# Patient Record
Sex: FEMALE | Race: WHITE | Employment: FULL TIME | URBAN - METROPOLITAN AREA
[De-identification: names, ages, dates, MRNs, and addresses within clinical notes are randomized per-mention and may not be internally consistent; named-entity substitution may affect disease eponyms.]

---

## 2021-12-05 ENCOUNTER — HOSPITAL ENCOUNTER (OUTPATIENT)
Age: 31
Discharge: HOME | End: 2021-12-05
Payer: COMMERCIAL

## 2021-12-05 VITALS
BODY MASS INDEX: 25.71 KG/M2 | SYSTOLIC BLOOD PRESSURE: 120 MMHG | RESPIRATION RATE: 20 BRPM | WEIGHT: 160 LBS | OXYGEN SATURATION: 100 % | HEIGHT: 66 IN | HEART RATE: 74 BPM | TEMPERATURE: 98 F | DIASTOLIC BLOOD PRESSURE: 61 MMHG

## 2021-12-05 DIAGNOSIS — U07.1 COVID-19 VIRUS INFECTION: Primary | ICD-10-CM

## 2021-12-05 DIAGNOSIS — R03.0 ELEVATED BLOOD PRESSURE READING: ICD-10-CM

## 2021-12-05 PROCEDURE — 99204 OFFICE O/P NEW MOD 45 MIN: CPT

## 2021-12-05 RX ORDER — ACETAMINOPHEN 325 MG/1
650 TABLET ORAL
Qty: 40 TABLET | Refills: 0 | Status: SHIPPED | OUTPATIENT
Start: 2021-12-05

## 2021-12-05 NOTE — ED QUICK NOTES
Patient with brief episode of loc after iv placement. Patient arousable to sternal rub alert and orientated. bp 84/41 hr 40, repeat bp 104/73 hr 83. Provider notified.

## 2021-12-05 NOTE — ED INITIAL ASSESSMENT (HPI)
Patient states her significant other began to develop flu like symptoms last Sunday. On Wednesday patient developed a dry cough, body aches and chills. She does note improvement in symptoms but she is still with congestion and raspy cough.   Also notes cr

## 2021-12-05 NOTE — ED PROVIDER NOTES
Patient Seen in: [facility]    History   Patient presents with:  Covid-19 Test    Stated Complaint: Flu-Covid like symptoms    HPI    78-year-old female presents with chief complaint of cough. Onset 4 days ago.  Patient reports associated nasa provider. Constitutional: The patient is cooperative. Appears well-developed and well-nourished. No acute distress. Psychological: Alert, No abnormalities of mood, affect. Head: Normocephalic/atraumatic. Nontender.   Eyes: Pupils are equal round reac or hospitalization and have the following risks factors–BMI greater than 25.       The patient/caregiver has been given the “Fact Sheet for Patients, Parents and Caregivers”, informed of alternatives to receiving authorized Casirivimab and Adelene Berry, and in reading    Disposition:  Discharge    Follow-up:  Corrine Segura MD  [address] [facility] [id number]    Call in 2 days  For follow-up      Medications Prescribed:  Current Discharge Medication List    START taking these me

## 2021-12-08 ENCOUNTER — HOSPITAL ENCOUNTER (OUTPATIENT)
Dept: ULTRASOUND IMAGING | Facility: HOSPITAL | Age: 31
Discharge: HOME | End: 2021-12-08
Attending: PHYSICIAN ASSISTANT
Payer: COMMERCIAL

## 2021-12-08 ENCOUNTER — TELEMEDICINE (OUTPATIENT)
Dept: FAMILY MEDICINE CLINIC | Facility: CLINIC | Age: 31
End: 2021-12-08

## 2021-12-08 DIAGNOSIS — M79.605 LEG PAIN, BILATERAL: Primary | ICD-10-CM

## 2021-12-08 DIAGNOSIS — M79.604 LEG PAIN, BILATERAL: Primary | ICD-10-CM

## 2021-12-08 DIAGNOSIS — M79.604 LEG PAIN, BILATERAL: ICD-10-CM

## 2021-12-08 DIAGNOSIS — U07.1 COVID-19 VIRUS INFECTION: ICD-10-CM

## 2021-12-08 DIAGNOSIS — M79.605 LEG PAIN, BILATERAL: ICD-10-CM

## 2021-12-08 PROCEDURE — 99202 OFFICE O/P NEW SF 15 MIN: CPT | Performed by: PHYSICIAN ASSISTANT

## 2021-12-08 PROCEDURE — 93970 EXTREMITY STUDY: CPT | Performed by: PHYSICIAN ASSISTANT

## 2021-12-09 NOTE — PROGRESS NOTES
HPI: HPI  Patient has tested positive for Covid on 12/05/21. Patient complains of loss of taste and smell today, mild cough, headache, sinus pressure and both legs pain when walking today.   Patient denies of chest pain, SOB, N/V/C/D, fever, dizziness, change, chills, fatigue and fever. HENT: Positive for sinus pressure and sinus pain. Negative for congestion, ear discharge, ear pain, postnasal drip, rhinorrhea and sore throat. Respiratory: Positive for cough.  Negative for chest tightness, shortness

## 2021-12-17 ENCOUNTER — TELEPHONE (OUTPATIENT)
Dept: CASE MANAGEMENT | Age: 31
End: 2021-12-17

## 2021-12-17 NOTE — TELEPHONE ENCOUNTER
Called patient to follow up for home monitoring  Left message to call back.    COVID 19 positive: 12/5

## 2022-11-21 ENCOUNTER — OFFICE VISIT (OUTPATIENT)
Dept: FAMILY MEDICINE CLINIC | Facility: CLINIC | Age: 32
End: 2022-11-21
Payer: COMMERCIAL

## 2022-11-21 ENCOUNTER — LAB ENCOUNTER (OUTPATIENT)
Dept: LAB | Age: 32
End: 2022-11-21
Attending: STUDENT IN AN ORGANIZED HEALTH CARE EDUCATION/TRAINING PROGRAM
Payer: COMMERCIAL

## 2022-11-21 VITALS
BODY MASS INDEX: 26.36 KG/M2 | DIASTOLIC BLOOD PRESSURE: 79 MMHG | HEART RATE: 71 BPM | HEIGHT: 66 IN | RESPIRATION RATE: 16 BRPM | SYSTOLIC BLOOD PRESSURE: 131 MMHG | WEIGHT: 164 LBS

## 2022-11-21 DIAGNOSIS — J02.9 SORE THROAT: ICD-10-CM

## 2022-11-21 DIAGNOSIS — Z00.00 WELL ADULT EXAM: ICD-10-CM

## 2022-11-21 DIAGNOSIS — Z00.00 WELL ADULT EXAM: Primary | ICD-10-CM

## 2022-11-21 LAB
ALBUMIN SERPL-MCNC: 4.1 G/DL (ref 3.4–5)
ALBUMIN/GLOB SERPL: 1.2 {RATIO} (ref 1–2)
ALP LIVER SERPL-CCNC: 66 U/L
ALT SERPL-CCNC: 22 U/L
ANION GAP SERPL CALC-SCNC: 8 MMOL/L (ref 0–18)
AST SERPL-CCNC: 16 U/L (ref 15–37)
BILIRUB SERPL-MCNC: 0.4 MG/DL (ref 0.1–2)
BILIRUB UR QL: NEGATIVE
BUN BLD-MCNC: 8 MG/DL (ref 7–18)
BUN/CREAT SERPL: 14.3 (ref 10–20)
CALCIUM BLD-MCNC: 8.9 MG/DL (ref 8.5–10.1)
CHLORIDE SERPL-SCNC: 107 MMOL/L (ref 98–112)
CHOLEST SERPL-MCNC: 185 MG/DL (ref ?–200)
CLARITY UR: CLEAR
CO2 SERPL-SCNC: 26 MMOL/L (ref 21–32)
COLOR UR: YELLOW
CONTROL LINE PRESENT WITH A CLEAR BACKGROUND (YES/NO): YES YES/NO
CREAT BLD-MCNC: 0.56 MG/DL
DEPRECATED RDW RBC AUTO: 36 FL (ref 35.1–46.3)
ERYTHROCYTE [DISTWIDTH] IN BLOOD BY AUTOMATED COUNT: 11.8 % (ref 11–15)
EST. AVERAGE GLUCOSE BLD GHB EST-MCNC: 97 MG/DL (ref 68–126)
FASTING PATIENT LIPID ANSWER: NO
FASTING STATUS PATIENT QL REPORTED: NO
GFR SERPLBLD BASED ON 1.73 SQ M-ARVRAT: 123 ML/MIN/1.73M2 (ref 60–?)
GLOBULIN PLAS-MCNC: 3.3 G/DL (ref 2.8–4.4)
GLUCOSE BLD-MCNC: 84 MG/DL (ref 70–99)
GLUCOSE UR-MCNC: NEGATIVE MG/DL
HBA1C MFR BLD: 5 % (ref ?–5.7)
HCT VFR BLD AUTO: 37.5 %
HDLC SERPL-MCNC: 49 MG/DL (ref 40–59)
HGB BLD-MCNC: 12.3 G/DL
HGB UR QL STRIP.AUTO: NEGATIVE
KETONES UR-MCNC: NEGATIVE MG/DL
KIT EXPIRATION DATE: NORMAL DATE
KIT LOT #: NORMAL NUMERIC
LDLC SERPL CALC-MCNC: 117 MG/DL (ref ?–100)
MCH RBC QN AUTO: 27.9 PG (ref 26–34)
MCHC RBC AUTO-ENTMCNC: 32.8 G/DL (ref 31–37)
MCV RBC AUTO: 85 FL
NITRITE UR QL STRIP.AUTO: NEGATIVE
NONHDLC SERPL-MCNC: 136 MG/DL (ref ?–130)
OSMOLALITY SERPL CALC.SUM OF ELEC: 290 MOSM/KG (ref 275–295)
PH UR: 7 [PH] (ref 5–8)
PLATELET # BLD AUTO: 242 10(3)UL (ref 150–450)
POTASSIUM SERPL-SCNC: 4.2 MMOL/L (ref 3.5–5.1)
PROT SERPL-MCNC: 7.4 G/DL (ref 6.4–8.2)
PROT UR-MCNC: NEGATIVE MG/DL
RBC # BLD AUTO: 4.41 X10(6)UL
SODIUM SERPL-SCNC: 141 MMOL/L (ref 136–145)
SP GR UR STRIP: 1.01 (ref 1–1.03)
STREP GRP A CUL-SCR: NEGATIVE
TRIGL SERPL-MCNC: 102 MG/DL (ref 30–149)
TSI SER-ACNC: 0.89 MIU/ML (ref 0.36–3.74)
UROBILINOGEN UR STRIP-ACNC: 0.2
VIT D+METAB SERPL-MCNC: 9.9 NG/ML (ref 30–100)
VLDLC SERPL CALC-MCNC: 18 MG/DL (ref 0–30)
WBC # BLD AUTO: 7 X10(3) UL (ref 4–11)

## 2022-11-21 PROCEDURE — 83036 HEMOGLOBIN GLYCOSYLATED A1C: CPT

## 2022-11-21 PROCEDURE — 3078F DIAST BP <80 MM HG: CPT | Performed by: STUDENT IN AN ORGANIZED HEALTH CARE EDUCATION/TRAINING PROGRAM

## 2022-11-21 PROCEDURE — 82306 VITAMIN D 25 HYDROXY: CPT

## 2022-11-21 PROCEDURE — 99385 PREV VISIT NEW AGE 18-39: CPT | Performed by: STUDENT IN AN ORGANIZED HEALTH CARE EDUCATION/TRAINING PROGRAM

## 2022-11-21 PROCEDURE — 3075F SYST BP GE 130 - 139MM HG: CPT | Performed by: STUDENT IN AN ORGANIZED HEALTH CARE EDUCATION/TRAINING PROGRAM

## 2022-11-21 PROCEDURE — 80053 COMPREHEN METABOLIC PANEL: CPT

## 2022-11-21 PROCEDURE — 36415 COLL VENOUS BLD VENIPUNCTURE: CPT

## 2022-11-21 PROCEDURE — 84443 ASSAY THYROID STIM HORMONE: CPT

## 2022-11-21 PROCEDURE — 81001 URINALYSIS AUTO W/SCOPE: CPT

## 2022-11-21 PROCEDURE — 3008F BODY MASS INDEX DOCD: CPT | Performed by: STUDENT IN AN ORGANIZED HEALTH CARE EDUCATION/TRAINING PROGRAM

## 2022-11-21 PROCEDURE — 81015 MICROSCOPIC EXAM OF URINE: CPT

## 2022-11-21 PROCEDURE — 87880 STREP A ASSAY W/OPTIC: CPT | Performed by: STUDENT IN AN ORGANIZED HEALTH CARE EDUCATION/TRAINING PROGRAM

## 2022-11-21 PROCEDURE — 85027 COMPLETE CBC AUTOMATED: CPT

## 2022-11-21 PROCEDURE — 80061 LIPID PANEL: CPT

## 2022-11-21 RX ORDER — CLOBETASOL PROPIONATE 0.5 MG/G
CREAM TOPICAL
COMMUNITY
Start: 2022-11-10 | End: 2022-11-21

## 2022-11-22 LAB
FLUAV + FLUBV RNA SPEC NAA+PROBE: NOT DETECTED
FLUAV + FLUBV RNA SPEC NAA+PROBE: NOT DETECTED
RSV RNA SPEC NAA+PROBE: NOT DETECTED
SARS-COV-2 RNA RESP QL NAA+PROBE: NOT DETECTED

## 2023-06-19 ENCOUNTER — HOSPITAL ENCOUNTER (OUTPATIENT)
Age: 33
Discharge: HOME | End: 2023-06-19
Payer: COMMERCIAL

## 2023-06-19 VITALS
OXYGEN SATURATION: 100 % | SYSTOLIC BLOOD PRESSURE: 125 MMHG | DIASTOLIC BLOOD PRESSURE: 70 MMHG | HEART RATE: 82 BPM | RESPIRATION RATE: 18 BRPM | TEMPERATURE: 98 F

## 2023-06-19 DIAGNOSIS — J01.90 ACUTE NON-RECURRENT SINUSITIS, UNSPECIFIED LOCATION: Primary | ICD-10-CM

## 2023-06-19 DIAGNOSIS — J02.9 ACUTE PHARYNGITIS, UNSPECIFIED ETIOLOGY: ICD-10-CM

## 2023-06-19 LAB — S PYO AG THROAT QL IA.RAPID: NEGATIVE

## 2023-06-19 PROCEDURE — 99213 OFFICE O/P EST LOW 20 MIN: CPT

## 2023-06-19 PROCEDURE — 87651 STREP A DNA AMP PROBE: CPT | Performed by: PHYSICIAN ASSISTANT

## 2023-06-19 RX ORDER — IBUPROFEN 600 MG/1
TABLET ORAL
Qty: 20 TABLET | Refills: 0 | Status: SHIPPED | OUTPATIENT
Start: 2023-06-19

## 2023-06-19 RX ORDER — AMOXICILLIN AND CLAVULANATE POTASSIUM 875; 125 MG/1; MG/1
1 TABLET, FILM COATED ORAL 2 TIMES DAILY
Qty: 14 TABLET | Refills: 0 | Status: SHIPPED | OUTPATIENT
Start: 2023-06-19 | End: 2023-06-26

## 2023-11-27 ENCOUNTER — APPOINTMENT (OUTPATIENT)
Dept: CT IMAGING | Age: 33
End: 2023-11-27
Attending: EMERGENCY MEDICINE

## 2023-11-27 ENCOUNTER — HOSPITAL ENCOUNTER (EMERGENCY)
Age: 33
Discharge: HOME | End: 2023-11-27
Attending: EMERGENCY MEDICINE

## 2023-11-27 VITALS
DIASTOLIC BLOOD PRESSURE: 82 MMHG | OXYGEN SATURATION: 100 % | SYSTOLIC BLOOD PRESSURE: 127 MMHG | HEIGHT: 66 IN | RESPIRATION RATE: 16 BRPM | BODY MASS INDEX: 27.53 KG/M2 | WEIGHT: 171.3 LBS | HEART RATE: 78 BPM | TEMPERATURE: 97.8 F

## 2023-11-27 DIAGNOSIS — J00 NASOPHARYNGITIS ACUTE: Primary | ICD-10-CM

## 2023-11-27 LAB
ALBUMIN SERPL-MCNC: 4 G/DL (ref 3.6–5.1)
ALBUMIN/GLOB SERPL: 1.1 {RATIO} (ref 1–2.4)
ALP SERPL-CCNC: 70 UNITS/L (ref 45–117)
ALT SERPL-CCNC: 23 UNITS/L
ANION GAP SERPL CALC-SCNC: 9 MMOL/L (ref 7–19)
AST SERPL-CCNC: 23 UNITS/L
BASOPHILS # BLD: 0.1 K/MCL (ref 0–0.3)
BASOPHILS NFR BLD: 1 %
BILIRUB SERPL-MCNC: 0.4 MG/DL (ref 0.2–1)
BUN SERPL-MCNC: 12 MG/DL (ref 6–20)
BUN/CREAT SERPL: 19 (ref 7–25)
CALCIUM SERPL-MCNC: 9 MG/DL (ref 8.4–10.2)
CHLORIDE SERPL-SCNC: 108 MMOL/L (ref 97–110)
CO2 SERPL-SCNC: 28 MMOL/L (ref 21–32)
CREAT SERPL-MCNC: 0.64 MG/DL (ref 0.51–0.95)
DEPRECATED RDW RBC: 36.4 FL (ref 39–50)
EGFRCR SERPLBLD CKD-EPI 2021: >90 ML/MIN/{1.73_M2}
EOSINOPHIL # BLD: 0.5 K/MCL (ref 0–0.5)
EOSINOPHIL NFR BLD: 5 %
ERYTHROCYTE [DISTWIDTH] IN BLOOD: 12 % (ref 11–15)
FASTING DURATION TIME PATIENT: ABNORMAL H
FLUAV RNA RESP QL NAA+PROBE: NOT DETECTED
FLUBV RNA RESP QL NAA+PROBE: NOT DETECTED
GLOBULIN SER-MCNC: 3.6 G/DL (ref 2–4)
GLUCOSE SERPL-MCNC: 100 MG/DL (ref 70–99)
HCG SERPL QL: NEGATIVE
HCT VFR BLD CALC: 39.1 % (ref 36–46.5)
HGB BLD-MCNC: 12.9 G/DL (ref 12–15.5)
IMM GRANULOCYTES # BLD AUTO: 0 K/MCL (ref 0–0.2)
IMM GRANULOCYTES # BLD: 0 %
LYMPHOCYTES # BLD: 2.8 K/MCL (ref 1–4.8)
LYMPHOCYTES NFR BLD: 26 %
MCH RBC QN AUTO: 27.7 PG (ref 26–34)
MCHC RBC AUTO-ENTMCNC: 33 G/DL (ref 32–36.5)
MCV RBC AUTO: 84.1 FL (ref 78–100)
MONOCYTES # BLD: 0.6 K/MCL (ref 0.3–0.9)
MONOCYTES NFR BLD: 6 %
NEUTROPHILS # BLD: 6.7 K/MCL (ref 1.8–7.7)
NEUTROPHILS NFR BLD: 62 %
NRBC BLD MANUAL-RTO: 0 /100 WBC
PLATELET # BLD AUTO: 265 K/MCL (ref 140–450)
POTASSIUM SERPL-SCNC: 3.7 MMOL/L (ref 3.4–5.1)
PROT SERPL-MCNC: 7.6 G/DL (ref 6.4–8.2)
RAINBOW EXTRA TUBES HOLD SPECIMEN: NORMAL
RAINBOW EXTRA TUBES HOLD SPECIMEN: NORMAL
RBC # BLD: 4.65 MIL/MCL (ref 4–5.2)
RSV AG NPH QL IA.RAPID: NOT DETECTED
S PYO DNA THROAT QL NAA+PROBE: NOT DETECTED
SARS-COV-2 RNA RESP QL NAA+PROBE: NOT DETECTED
SERVICE CMNT-IMP: NORMAL
SERVICE CMNT-IMP: NORMAL
SODIUM SERPL-SCNC: 141 MMOL/L (ref 135–145)
WBC # BLD: 10.6 K/MCL (ref 4.2–11)

## 2023-11-27 PROCEDURE — 70491 CT SOFT TISSUE NECK W/DYE: CPT

## 2023-11-27 PROCEDURE — 10002805 HB CONTRAST AGENT: Performed by: EMERGENCY MEDICINE

## 2023-11-27 PROCEDURE — 10002800 HB RX 250 W HCPCS: Performed by: EMERGENCY MEDICINE

## 2023-11-27 PROCEDURE — 87651 STREP A DNA AMP PROBE: CPT | Performed by: EMERGENCY MEDICINE

## 2023-11-27 PROCEDURE — 96365 THER/PROPH/DIAG IV INF INIT: CPT

## 2023-11-27 PROCEDURE — 0241U COVID/FLU/RSV PANEL: CPT | Performed by: EMERGENCY MEDICINE

## 2023-11-27 PROCEDURE — 99283 EMERGENCY DEPT VISIT LOW MDM: CPT

## 2023-11-27 PROCEDURE — 96375 TX/PRO/DX INJ NEW DRUG ADDON: CPT

## 2023-11-27 PROCEDURE — 85025 COMPLETE CBC W/AUTO DIFF WBC: CPT | Performed by: EMERGENCY MEDICINE

## 2023-11-27 PROCEDURE — 10002807 HB RX 258: Performed by: EMERGENCY MEDICINE

## 2023-11-27 PROCEDURE — 80053 COMPREHEN METABOLIC PANEL: CPT | Performed by: EMERGENCY MEDICINE

## 2023-11-27 PROCEDURE — C9803 HOPD COVID-19 SPEC COLLECT: HCPCS

## 2023-11-27 PROCEDURE — 84703 CHORIONIC GONADOTROPIN ASSAY: CPT | Performed by: EMERGENCY MEDICINE

## 2023-11-27 RX ORDER — AMOXICILLIN AND CLAVULANATE POTASSIUM 875; 125 MG/1; MG/1
1 TABLET, FILM COATED ORAL 2 TIMES DAILY
Qty: 20 TABLET | Refills: 0 | Status: SHIPPED | OUTPATIENT
Start: 2023-11-27 | End: 2023-12-07

## 2023-11-27 RX ORDER — METHYLPREDNISOLONE 4 MG/1
TABLET ORAL
Qty: 21 TABLET | Refills: 0 | Status: SHIPPED | OUTPATIENT
Start: 2023-11-27

## 2023-11-27 RX ORDER — KETOROLAC TROMETHAMINE 15 MG/ML
15 INJECTION, SOLUTION INTRAMUSCULAR; INTRAVENOUS ONCE
Status: COMPLETED | OUTPATIENT
Start: 2023-11-27 | End: 2023-11-27

## 2023-11-27 RX ORDER — DEXAMETHASONE SODIUM PHOSPHATE 10 MG/ML
10 INJECTION, SOLUTION INTRAMUSCULAR; INTRAVENOUS ONCE
Status: COMPLETED | OUTPATIENT
Start: 2023-11-27 | End: 2023-11-27

## 2023-11-27 RX ADMIN — AMPICILLIN AND SULBACTAM 3 G: 2; 1 INJECTION, POWDER, FOR SOLUTION INTRAVENOUS at 04:36

## 2023-11-27 RX ADMIN — IOHEXOL 85 ML: 350 INJECTION, SOLUTION INTRAVENOUS at 03:07

## 2023-11-27 RX ADMIN — KETOROLAC TROMETHAMINE 15 MG: 15 INJECTION, SOLUTION INTRAMUSCULAR; INTRAVENOUS at 02:49

## 2023-11-27 RX ADMIN — DEXAMETHASONE SODIUM PHOSPHATE 10 MG: 10 INJECTION INTRAMUSCULAR; INTRAVENOUS at 02:48

## 2023-11-27 ASSESSMENT — PAIN SCALES - GENERAL
PAINLEVEL_OUTOF10: 8
PAINLEVEL_OUTOF10: 8

## 2023-11-27 ASSESSMENT — PAIN DESCRIPTION - PAIN TYPE: TYPE: ACUTE PAIN

## 2023-11-30 ENCOUNTER — OFFICE VISIT (OUTPATIENT)
Dept: FAMILY MEDICINE CLINIC | Facility: CLINIC | Age: 33
End: 2023-11-30
Payer: COMMERCIAL

## 2023-11-30 VITALS
HEART RATE: 70 BPM | DIASTOLIC BLOOD PRESSURE: 78 MMHG | RESPIRATION RATE: 18 BRPM | BODY MASS INDEX: 27 KG/M2 | WEIGHT: 164 LBS | SYSTOLIC BLOOD PRESSURE: 115 MMHG | HEIGHT: 65.5 IN | TEMPERATURE: 97 F

## 2023-11-30 DIAGNOSIS — Z00.00 ROUTINE PHYSICAL EXAMINATION: Primary | ICD-10-CM

## 2023-11-30 PROCEDURE — 3078F DIAST BP <80 MM HG: CPT | Performed by: FAMILY MEDICINE

## 2023-11-30 PROCEDURE — 3008F BODY MASS INDEX DOCD: CPT | Performed by: FAMILY MEDICINE

## 2023-11-30 PROCEDURE — 3074F SYST BP LT 130 MM HG: CPT | Performed by: FAMILY MEDICINE

## 2023-11-30 PROCEDURE — 99395 PREV VISIT EST AGE 18-39: CPT | Performed by: FAMILY MEDICINE

## 2023-11-30 RX ORDER — METHYLPREDNISOLONE 4 MG/1
1 TABLET ORAL AS DIRECTED
COMMUNITY
Start: 2023-11-27

## 2023-11-30 RX ORDER — AMOXICILLIN AND CLAVULANATE POTASSIUM 875; 125 MG/1; MG/1
1 TABLET, FILM COATED ORAL 2 TIMES DAILY
COMMUNITY
Start: 2023-11-27 | End: 2023-12-07

## 2023-11-30 NOTE — PROGRESS NOTES
Subjective:   Patient ID: Matt Lee is a 26year old female. Patient is here for routine physical exam. No significant chronic medical problems. Diet and exercise have been fair. Pt requesting blood work. Past medical history, family history, and social history were reviewed. Family hx of hypertension and diabetes. Has appointment with gyne for well woman tala.  Pt is on abx /steroid pack for a recent throat infection. Feeling much better now. History/Other:   Review of Systems   Constitutional: Negative. Negative for fever. HENT: Negative. Negative for congestion, ear pain and sore throat. Eyes: Negative. Respiratory: Negative. Negative for cough and shortness of breath. Cardiovascular: Negative. Negative for chest pain. Gastrointestinal: Negative. Negative for abdominal pain. Endocrine: Negative. Genitourinary: Negative. Negative for difficulty urinating. Musculoskeletal: Negative. Skin: Negative. Allergic/Immunologic: Negative. Neurological: Negative. Negative for dizziness and headaches. Hematological: Negative. Psychiatric/Behavioral: Negative. Negative for dysphoric mood. The patient is not nervous/anxious. Current Outpatient Medications   Medication Sig Dispense Refill    IUD'S IU       methylPREDNISolone 4 MG Oral Tablet Therapy Pack Take 1 tablet (4 mg total) by mouth As Directed. amoxicillin clavulanate 875-125 MG Oral Tab Take 1 tablet by mouth 2 (two) times daily. Allergies: Allergies   Allergen Reactions    Seasonal UNKNOWN       Objective:   Physical Exam  Constitutional:       Appearance: Normal appearance. She is well-developed. HENT:      Head: Normocephalic and atraumatic.       Right Ear: Tympanic membrane and external ear normal.      Left Ear: Tympanic membrane and external ear normal.      Nose: Nose normal.      Mouth/Throat:      Mouth: Mucous membranes are moist.      Pharynx: No posterior oropharyngeal erythema. Eyes:      Conjunctiva/sclera: Conjunctivae normal.   Neck:      Thyroid: No thyroid mass, thyromegaly or thyroid tenderness. Cardiovascular:      Rate and Rhythm: Normal rate and regular rhythm. Pulses: Normal pulses. Heart sounds: Normal heart sounds. Pulmonary:      Effort: Pulmonary effort is normal.      Breath sounds: Normal breath sounds. Abdominal:      General: Abdomen is flat. There is no distension. Palpations: Abdomen is soft. Tenderness: There is no abdominal tenderness. There is no guarding or rebound. Genitourinary:     Vagina: Normal.   Musculoskeletal:         General: Normal range of motion. Cervical back: Normal range of motion and neck supple. Lymphadenopathy:      Cervical: No cervical adenopathy. Skin:     General: Skin is warm. Neurological:      General: No focal deficit present. Mental Status: She is alert and oriented to person, place, and time. Deep Tendon Reflexes: Reflexes are normal and symmetric. Reflexes normal.   Psychiatric:         Mood and Affect: Mood normal.         Behavior: Behavior normal.         Thought Content: Thought content normal.         Judgment: Judgment normal.         Assessment & Plan:   1. Routine physical examination:  - Exam is unremarkable. Screening tests were discussed, and after discussion, will check lab work as below. Healthy diet, exercise, and weight were discussed. To call if problems and follow up and further management after testing. Routine follow up with gyne as scheduled. No orders of the defined types were placed in this encounter.       Meds This Visit:  Requested Prescriptions      No prescriptions requested or ordered in this encounter       Imaging & Referrals:  None

## 2023-12-16 ENCOUNTER — LAB ENCOUNTER (OUTPATIENT)
Dept: LAB | Age: 33
End: 2023-12-16
Payer: COMMERCIAL

## 2023-12-16 DIAGNOSIS — Z00.00 ROUTINE PHYSICAL EXAMINATION: ICD-10-CM

## 2023-12-16 LAB
ALBUMIN SERPL-MCNC: 4.4 G/DL (ref 3.2–4.8)
ALBUMIN/GLOB SERPL: 1.6 {RATIO} (ref 1–2)
ALP LIVER SERPL-CCNC: 63 U/L
ALT SERPL-CCNC: 12 U/L
ANION GAP SERPL CALC-SCNC: 3 MMOL/L (ref 0–18)
AST SERPL-CCNC: 17 U/L (ref ?–34)
BASOPHILS # BLD AUTO: 0.04 X10(3) UL (ref 0–0.2)
BASOPHILS NFR BLD AUTO: 0.8 %
BILIRUB SERPL-MCNC: 0.8 MG/DL (ref 0.3–1.2)
BUN BLD-MCNC: 10 MG/DL (ref 9–23)
BUN/CREAT SERPL: 14.7 (ref 10–20)
CALCIUM BLD-MCNC: 9.4 MG/DL (ref 8.7–10.4)
CHLORIDE SERPL-SCNC: 109 MMOL/L (ref 98–112)
CHOLEST SERPL-MCNC: 229 MG/DL (ref ?–200)
CO2 SERPL-SCNC: 27 MMOL/L (ref 21–32)
CREAT BLD-MCNC: 0.68 MG/DL
DEPRECATED RDW RBC AUTO: 36.9 FL (ref 35.1–46.3)
EGFRCR SERPLBLD CKD-EPI 2021: 116 ML/MIN/1.73M2 (ref 60–?)
EOSINOPHIL # BLD AUTO: 0.23 X10(3) UL (ref 0–0.7)
EOSINOPHIL NFR BLD AUTO: 4.5 %
ERYTHROCYTE [DISTWIDTH] IN BLOOD BY AUTOMATED COUNT: 12.1 % (ref 11–15)
EST. AVERAGE GLUCOSE BLD GHB EST-MCNC: 105 MG/DL (ref 68–126)
FASTING PATIENT LIPID ANSWER: YES
FASTING STATUS PATIENT QL REPORTED: YES
GLOBULIN PLAS-MCNC: 2.8 G/DL (ref 2.8–4.4)
GLUCOSE BLD-MCNC: 93 MG/DL (ref 70–99)
HBA1C MFR BLD: 5.3 % (ref ?–5.7)
HCT VFR BLD AUTO: 37.1 %
HDLC SERPL-MCNC: 45 MG/DL (ref 40–59)
HGB BLD-MCNC: 12.6 G/DL
IMM GRANULOCYTES # BLD AUTO: 0.01 X10(3) UL (ref 0–1)
IMM GRANULOCYTES NFR BLD: 0.2 %
LDLC SERPL CALC-MCNC: 171 MG/DL (ref ?–100)
LYMPHOCYTES # BLD AUTO: 1.57 X10(3) UL (ref 1–4)
LYMPHOCYTES NFR BLD AUTO: 30.7 %
MCH RBC QN AUTO: 28.1 PG (ref 26–34)
MCHC RBC AUTO-ENTMCNC: 34 G/DL (ref 31–37)
MCV RBC AUTO: 82.8 FL
MONOCYTES # BLD AUTO: 0.29 X10(3) UL (ref 0.1–1)
MONOCYTES NFR BLD AUTO: 5.7 %
NEUTROPHILS # BLD AUTO: 2.98 X10 (3) UL (ref 1.5–7.7)
NEUTROPHILS # BLD AUTO: 2.98 X10(3) UL (ref 1.5–7.7)
NEUTROPHILS NFR BLD AUTO: 58.1 %
NONHDLC SERPL-MCNC: 184 MG/DL (ref ?–130)
OSMOLALITY SERPL CALC.SUM OF ELEC: 287 MOSM/KG (ref 275–295)
PLATELET # BLD AUTO: 233 10(3)UL (ref 150–450)
POTASSIUM SERPL-SCNC: 4.2 MMOL/L (ref 3.5–5.1)
PROT SERPL-MCNC: 7.2 G/DL (ref 5.7–8.2)
RBC # BLD AUTO: 4.48 X10(6)UL
SODIUM SERPL-SCNC: 139 MMOL/L (ref 136–145)
TRIGL SERPL-MCNC: 77 MG/DL (ref 30–149)
TSI SER-ACNC: 2.06 MIU/ML (ref 0.55–4.78)
VLDLC SERPL CALC-MCNC: 15 MG/DL (ref 0–30)
WBC # BLD AUTO: 5.1 X10(3) UL (ref 4–11)

## 2023-12-16 PROCEDURE — 80061 LIPID PANEL: CPT

## 2023-12-16 PROCEDURE — 83036 HEMOGLOBIN GLYCOSYLATED A1C: CPT

## 2023-12-16 PROCEDURE — 80053 COMPREHEN METABOLIC PANEL: CPT

## 2023-12-16 PROCEDURE — 85025 COMPLETE CBC W/AUTO DIFF WBC: CPT

## 2023-12-16 PROCEDURE — 36415 COLL VENOUS BLD VENIPUNCTURE: CPT

## 2023-12-16 PROCEDURE — 84443 ASSAY THYROID STIM HORMONE: CPT

## 2024-01-10 ENCOUNTER — OFFICE VISIT (OUTPATIENT)
Dept: OBGYN CLINIC | Facility: CLINIC | Age: 34
End: 2024-01-10

## 2024-01-10 VITALS
SYSTOLIC BLOOD PRESSURE: 129 MMHG | HEART RATE: 59 BPM | WEIGHT: 165.81 LBS | BODY MASS INDEX: 27 KG/M2 | DIASTOLIC BLOOD PRESSURE: 86 MMHG

## 2024-01-10 DIAGNOSIS — Z87.42 HISTORY OF DYSMENORRHEA: ICD-10-CM

## 2024-01-10 DIAGNOSIS — Z12.4 SCREENING FOR CERVICAL CANCER: ICD-10-CM

## 2024-01-10 DIAGNOSIS — Z01.419 ENCOUNTER FOR GYNECOLOGICAL EXAMINATION WITHOUT ABNORMAL FINDING: Primary | ICD-10-CM

## 2024-01-10 DIAGNOSIS — Z97.5 IUD (INTRAUTERINE DEVICE) IN PLACE: ICD-10-CM

## 2024-01-10 PROCEDURE — 99395 PREV VISIT EST AGE 18-39: CPT | Performed by: OBSTETRICS & GYNECOLOGY

## 2024-01-10 PROCEDURE — 3074F SYST BP LT 130 MM HG: CPT | Performed by: OBSTETRICS & GYNECOLOGY

## 2024-01-10 PROCEDURE — 3079F DIAST BP 80-89 MM HG: CPT | Performed by: OBSTETRICS & GYNECOLOGY

## 2024-01-10 NOTE — PROGRESS NOTES
Melissa Schoenheider is a 33 year old female  No LMP recorded. (Menstrual status: IUD - Intrauterine Device).    Chief Complaint   Patient presents with    Gyn Exam     New pt annual // IUD removal // OCC pelvic pain    .     Pt had kyleena placed in 2018- she will reschedule for either exchange or just removal.  She has no gyne complaints.    OBSTETRICS HISTORY:  OB History    Para Term  AB Living   0 0 0 0 0 0   SAB IAB Ectopic Multiple Live Births   0 0 0 0 0       GYNE HISTORY:   Pap Result Notes: Last pap 2019 per pt      MEDICAL HISTORY:  History reviewed. No pertinent past medical history.  History reviewed. No pertinent surgical history.      SOCIAL HISTORY:  Social History     Socioeconomic History    Marital status: Single   Tobacco Use    Smoking status: Never     Passive exposure: Never    Smokeless tobacco: Never   Vaping Use    Vaping Use: Never used   Substance and Sexual Activity    Alcohol use: Yes     Comment: socially    Drug use: Never    Sexual activity: Yes     Birth control/protection: I.U.D.        FAMILY HISTORY:  Family History   Problem Relation Age of Onset    Hypertension Mother 38    Other (congestive heart failure) Maternal Grandmother        MEDICATIONS:    Current Outpatient Medications:     IUD'S IU, , Disp: , Rfl:     methylPREDNISolone 4 MG Oral Tablet Therapy Pack, Take 1 tablet (4 mg total) by mouth As Directed. (Patient not taking: Reported on 1/10/2024), Disp: , Rfl:     ALLERGIES:    Allergies   Allergen Reactions    Seasonal UNKNOWN       Blood pressure 129/86, pulse 59, weight 165 lb 12.8 oz (75.2 kg).    Review of Systems:  Constitutional:  Denies fatigue, night sweats, hot flashes  Eyes:  denies blurred or double vision  Cardiovascular:  denies chest pain or palpitations  Respiratory:  denies shortness of breath  Gastrointestinal:  denies heartburn, abdominal pain, diarrhea or constipation  Genitourinary:  denies dysuria, incontinence,  abnormal vaginal discharge, vaginal itching  Musculoskeletal:  denies back pain.  Skin/Breast:  Denies any breast pain, lumps, or discharge.   Neurological:  denies headaches, extremity weakness or numbness.  Psychiatric: denies depression or anxiety.  Endocrine:   denies excessive thirst or urination.  Heme/Lymph:  denies history of anemia, easy bruising or bleeding.    Depression Screening (PHQ-2/PHQ-9): Over the LAST 2 WEEKS   Little interest or pleasure in doing things: Not at all    Feeling down, depressed, or hopeless: Not at all    PHQ-2 SCORE: 0           PHYSICAL EXAM:   Constitutional: well developed, well nourished  Head/Face: normocephalic  Neck/Thyroid: thyroid symmetric, no thyromegaly, no nodules, no adenopathy  Lymphatic:no abnormal supraclavicular or axillary adenopathy is noted  Breast: normal without palpable masses, tenderness, asymmetry, nipple discharge, nipple retraction or skin changes  Respiratory:  lungs clear to auscultation bilaterally  Cardiovascular: regular rate and rhythm, no significant murmur  Abdomen:  soft, nontender, nondistended, no masses  Skin/Hair: no unusual rashes or bruises  Extremities: no edema, no cyanosis  Psychiatric:  Oriented to time, place, person and situation. Appropriate mood and affect    Pelvic Exam:  External Genitalia: normal appearance, hair distribution, and no lesions  Urethral Meatus:  normal in size, location, without lesions and prolapse  Bladder:  No fullness, masses or tenderness  Vagina:  Normal appearance without lesions, no abnormal discharge  Cervix:  Normal without tenderness on motion, IUD string very short but visible  Uterus: normal in size, contour, position, mobility, without tenderness  Adnexa: normal without masses or tenderness  Perineum: normal  Anus: no hemorroids     Assessment & Plan:    Encounter Diagnoses   Name Primary?    Encounter for gynecological examination without abnormal finding Yes    IUD (intrauterine device) in place      History of dysmenorrhea      ASCCP guidelines discussed,cotest done,rtc 1 year for annual exam   SBE encouraged  Rtc for IUD reoval or exchange  No orders of the defined types were placed in this encounter.      Requested Prescriptions      No prescriptions requested or ordered in this encounter       None

## 2024-01-11 LAB — HPV I/H RISK 1 DNA SPEC QL NAA+PROBE: NEGATIVE

## 2024-01-18 ENCOUNTER — PATIENT MESSAGE (OUTPATIENT)
Dept: OBGYN CLINIC | Facility: CLINIC | Age: 34
End: 2024-01-18

## 2024-01-18 NOTE — TELEPHONE ENCOUNTER
From: Melissa Schoenheider  To: Precious GRAY Page  Sent: 1/18/2024 2:57 PM CST  Subject: Test Results    Hi  Page!     I just wanted to check in on the pap smear test results, please. I know you mentioned it takes about a week, but haven't seen anything come through yet. Did they come in yet that you know of?     Thank you!   Yessi

## 2024-02-28 ENCOUNTER — OFFICE VISIT (OUTPATIENT)
Dept: OBGYN CLINIC | Facility: CLINIC | Age: 34
End: 2024-02-28

## 2024-02-28 DIAGNOSIS — Z30.432 ENCOUNTER FOR REMOVAL OF INTRAUTERINE CONTRACEPTIVE DEVICE: Primary | ICD-10-CM

## 2024-02-28 PROCEDURE — 58301 REMOVE INTRAUTERINE DEVICE: CPT | Performed by: OBSTETRICS & GYNECOLOGY

## 2024-03-04 NOTE — PROCEDURES
IUD Removal     Pregnancy Results: negative from  n/a  test   Consent signed.  Procedure discussed with the patient in detail including indication, risks, benefits, alternatives and complications.    Pelvic Exam Findings:      Procedure:  Speculum placed in the vagina.  Betadine wash of vagina and cervix.  A Ring Forceps was used to grasp IUD strings.  Kyleena  IUD was removed without difficulty.  The patient tolerated the procedure well.      Visit Plan:  Discharge instructions were reviewed with the patient.

## 2024-07-11 ENCOUNTER — NURSE TRIAGE (OUTPATIENT)
Dept: FAMILY MEDICINE CLINIC | Facility: CLINIC | Age: 34
End: 2024-07-11

## 2024-07-11 ENCOUNTER — OFFICE VISIT (OUTPATIENT)
Dept: INTERNAL MEDICINE CLINIC | Facility: CLINIC | Age: 34
End: 2024-07-11

## 2024-07-11 VITALS
WEIGHT: 160 LBS | OXYGEN SATURATION: 97 % | HEIGHT: 65 IN | SYSTOLIC BLOOD PRESSURE: 112 MMHG | BODY MASS INDEX: 26.66 KG/M2 | HEART RATE: 78 BPM | DIASTOLIC BLOOD PRESSURE: 75 MMHG

## 2024-07-11 DIAGNOSIS — D17.79 LIPOMA OF OTHER SPECIFIED SITES: ICD-10-CM

## 2024-07-11 DIAGNOSIS — L60.0 INGROWN TOENAIL OF LEFT FOOT: Primary | ICD-10-CM

## 2024-07-11 PROCEDURE — 3074F SYST BP LT 130 MM HG: CPT | Performed by: INTERNAL MEDICINE

## 2024-07-11 PROCEDURE — 99203 OFFICE O/P NEW LOW 30 MIN: CPT | Performed by: INTERNAL MEDICINE

## 2024-07-11 PROCEDURE — 3078F DIAST BP <80 MM HG: CPT | Performed by: INTERNAL MEDICINE

## 2024-07-11 PROCEDURE — 3008F BODY MASS INDEX DOCD: CPT | Performed by: INTERNAL MEDICINE

## 2024-07-11 RX ORDER — CEPHALEXIN 500 MG/1
500 CAPSULE ORAL 3 TIMES DAILY
Qty: 21 CAPSULE | Refills: 0 | Status: SHIPPED | OUTPATIENT
Start: 2024-07-11 | End: 2024-07-18

## 2024-07-11 NOTE — TELEPHONE ENCOUNTER
Action Requested: Summary for Provider     []  Critical Lab, Recommendations Needed  [] Need Additional Advice  []   FYI    []   Need Orders  [] Need Medications Sent to Pharmacy  []  Other     SUMMARY: Patient also reports bump in armpit, pain, swelling and red.     Reason for call: Toe Pain  Onset: 1 week    Spoke to patient, full name and date of birth verified.   Patient has been treating ingrown toenail x 1 week with antibiotic ointment. Not improving.  Random discomfort now in calf and thigh on same side of body.     Patient reports she was tested for a blood clot > 5 years ago, and result was negative. No other blood clot history.    Red, swollen painful lump in armpit also, patient is concerned she has an infection.     Reason for Disposition   Localized redness and swelling of skin around nail (i.e., cuticle area or nail fold)    Answer Assessment - Initial Assessment Questions  1. ONSET: \"When did the pain start?\"       1 week ago  2. LOCATION: \"Where is the pain located?\"   (e.g., around nail, entire toe, at foot joint)       Around left great toenail  3. PAIN: \"How bad is the pain?\"    (Scale 1-10; or mild, moderate, severe)    -  MILD (1-3): doesn't interfere with normal activities     -  MODERATE (4-7): interferes with normal activities (e.g., work or school) or awakens from sleep, limping     -  SEVERE (8-10): excruciating pain, unable to do any normal activities, unable to walk      Mild   4. APPEARANCE: \"What does the toe look like?\" (e.g., redness, swelling, bruising, pallor)      Redness, bump, mild swelling  5. CAUSE: \"What do you think is causing the toe pain?\"      Ingrown toenail - patient has been using antibiotic ointment at home  6. OTHER SYMPTOMS: \"Do you have any other symptoms?\" (e.g., leg pain, rash, fever, numbness)      Intermittent left calf and left thigh pain, \"weird\" intermittent \"pulsing, feels like whooshing blood\"   7. PREGNANCY: \"Is there any chance you are pregnant?\" \"When  was your last menstrual period?\"      N/A    Protocols used: Toe Pain-A-[place]

## 2024-07-11 NOTE — PROGRESS NOTES
Patient ID: Melissa Schoenheider is a 33 year old female.  Chief Complaint   Patient presents with    Bump     Bump under arm pt reports swelling and pain 5/10 pain level     Ingrown Toenail     Pt reports ingrown nail on left foot last week and says it has gotten better but now has pain on her calf and going up to her thigh.         HISTORY OF PRESENT ILLNESS:   HPI  Patient presents for above.  New patient here for multiple issues.    For the past week has been dealing with an ingrown toenail of her left big toe.  She has trimmed this back and been using topical solutions.  Has not been doing Epsom salts.  Does not believe she has ever had this before.  Never seen a podiatrist.  She does have some erythema of the nailbed.  Denies fevers or chills.    Has known lipoma in her right axillary region.  Believes this has acutely increased in size over the past 24 to 48 hours.  Does have mild discomfort.  She does admit that she has not paid attention to it much and the recent past and could have been bigger without her realizing.    Review of Systems  Ten point review of systems otherwise negative with the exception of HPI and assessment and plan.    MEDICAL HISTORY:   History reviewed. No pertinent past medical history.    History reviewed. No pertinent surgical history.      Current Outpatient Medications:     cephalexin 500 MG Oral Cap, Take 1 capsule (500 mg total) by mouth 3 (three) times daily for 7 days., Disp: 21 capsule, Rfl: 0    methylPREDNISolone 4 MG Oral Tablet Therapy Pack, Take 1 tablet (4 mg total) by mouth As Directed. (Patient not taking: Reported on 1/10/2024), Disp: , Rfl:     Allergies:  Allergies   Allergen Reactions    Seasonal UNKNOWN       Social History     Socioeconomic History    Marital status: Single     Spouse name: Not on file    Number of children: Not on file    Years of education: Not on file    Highest education level: Not on file   Occupational History    Not on file   Tobacco Use     Smoking status: Never     Passive exposure: Never    Smokeless tobacco: Never   Vaping Use    Vaping status: Never Used   Substance and Sexual Activity    Alcohol use: Yes     Comment: socially    Drug use: Never    Sexual activity: Yes     Birth control/protection: I.U.D.   Other Topics Concern    Not on file   Social History Narrative    Not on file     Social Determinants of Health     Financial Resource Strain: Not on file   Food Insecurity: Not on file   Transportation Needs: Not on file   Physical Activity: Not on file   Stress: Not on file   Social Connections: Not on file   Housing Stability: Not on file           PHYSICAL EXAM:     Vitals:    07/11/24 1218   BP: 112/75   Pulse: 78   SpO2: 97%   Weight: 160 lb (72.6 kg)   Height: 5' 5\" (1.651 m)       Body mass index is 26.63 kg/m².    Physical Exam  Constitutional:       Appearance: Normal appearance.   Eyes:      General: No scleral icterus.  Cardiovascular:      Rate and Rhythm: Normal rate and regular rhythm.      Pulses: Normal pulses.      Heart sounds: Normal heart sounds. No murmur heard.  Pulmonary:      Effort: Pulmonary effort is normal. No respiratory distress.      Breath sounds: Normal breath sounds. No stridor. No wheezing or rhonchi.   Musculoskeletal:        Feet:    Skin:         Neurological:      Mental Status: She is alert.   Psychiatric:         Mood and Affect: Mood normal.         Behavior: Behavior normal.           ASSESSMENT/PLAN:   1. Ingrown toenail of left foot  cephalexin 500 MG Oral Cap; Take 1 capsule (500 mg total) by mouth 3 (three) times daily for 7 days.  Dispense: 21 capsule; Refill: 0  Podiatry Referral - [facility])  Home care tips given.    2. Lipoma of other specified sites  Surgery Referral - [facility]) - Adult & Peds    Return if symptoms worsen or fail to improve.    This note was prepared using Dragon Medical voice recognition dictation software. As a result errors may  occur. When identified these errors have been corrected. While every attempt is made to correct errors during dictation discrepancies may still exist.    Marcus Tyson MD  7/11/2024

## 2024-08-22 ENCOUNTER — OFFICE VISIT (OUTPATIENT)
Dept: PODIATRY CLINIC | Facility: CLINIC | Age: 34
End: 2024-08-22
Payer: COMMERCIAL

## 2024-08-22 DIAGNOSIS — L60.0 INGROWN TOENAIL: Primary | ICD-10-CM

## 2024-08-22 PROCEDURE — 99203 OFFICE O/P NEW LOW 30 MIN: CPT | Performed by: STUDENT IN AN ORGANIZED HEALTH CARE EDUCATION/TRAINING PROGRAM

## 2024-08-22 NOTE — PROGRESS NOTES
[facility]  Progress Note      Melissa Schoenheider is a 33 year old female.   Chief Complaint   Patient presents with    Ingrown Toenail     C/o of left foot big toe ingrown toenail, pt. States she trimmed her toe nails herself a month ago. Left big toe got infected afterwards and was put on an antibiotic, Pt. Feels like the nail has not fully healed.              HPI:     Patient is a pleasant 33-year-old female presents to clinic today for attention of a left medial hallux ingrown toenail.  Admits that she was on oral antibiotics and her pain has improved since.  She still admits to discomfort along the medial aspect of the left great toenail.  Denies any signs of infection.    Allergies: Seasonal    Current Outpatient Medications   Medication Sig Dispense Refill    methylPREDNISolone 4 MG Oral Tablet Therapy Pack Take 1 tablet (4 mg total) by mouth As Directed. (Patient not taking: Reported on 1/10/2024)        History reviewed. No pertinent past medical history.   History reviewed. No pertinent surgical history.   Family History   Problem Relation Age of Onset    Hypertension Mother 38    Other (congestive heart failure) Maternal Grandmother       Social History     Socioeconomic History    Marital status: Single   Tobacco Use    Smoking status: Never     Passive exposure: Never    Smokeless tobacco: Never   Vaping Use    Vaping status: Never Used   Substance and Sexual Activity    Alcohol use: Yes     Comment: socially    Drug use: Never    Sexual activity: Yes     Birth control/protection: I.U.D.           REVIEW OF SYSTEMS:     Denies nause, fever, chills  No calf pain  Denies chest pain or SOB      EXAM:   LMP 06/20/2024 (Exact Date)   GENERAL: well developed, well nourished, in no apparent distress  EXTREMITIES:   1. Integument: Normal skin temperature and turgor.  Mild incurvation to the left medial hallux border with no signs of infection.  2. Vascular: Dorsalis pedis two out of four  bilateral and posterior tibial pulses two out of   four bilateral, capillary refill normal.   3. Musculoskeletal: All muscle groups are graded 5 out of 5 in the foot and ankle.  Tenderness with palpation to the left medial hallux nail under   4. Neurological: Normal sharp dull sensation; reflexes normal.             ASSESSMENT AND PLAN:   Diagnoses and all orders for this visit:    Ingrown toenail        Plan:     Patient seen and examined and findings discussed with patient.  Informed patient that the nail site is not infected.  Discussed avulsion with chemical matricectomy versus a slant back.  Patient wishes to proceed with a slant back today.  Using sterile nail nippers a slant back was performed at the left medial hallux nail border.  Discussed with patient to soak her feet with warm water and Epsom salt and apply topical bacitracin or Neosporin with a Band-Aid.  Return to clinic if symptoms worsen or fail to improve    The patient indicates understanding of these issues and agrees to the plan.        Colleen Raymond DPM

## 2025-04-10 ENCOUNTER — OFFICE VISIT (OUTPATIENT)
Dept: FAMILY MEDICINE CLINIC | Facility: CLINIC | Age: 35
End: 2025-04-10
Payer: COMMERCIAL

## 2025-04-10 ENCOUNTER — LAB ENCOUNTER (OUTPATIENT)
Dept: LAB | Age: 35
End: 2025-04-10
Attending: STUDENT IN AN ORGANIZED HEALTH CARE EDUCATION/TRAINING PROGRAM
Payer: COMMERCIAL

## 2025-04-10 VITALS
HEART RATE: 71 BPM | DIASTOLIC BLOOD PRESSURE: 72 MMHG | WEIGHT: 164 LBS | BODY MASS INDEX: 27.32 KG/M2 | TEMPERATURE: 98 F | SYSTOLIC BLOOD PRESSURE: 112 MMHG | OXYGEN SATURATION: 99 % | HEIGHT: 65 IN

## 2025-04-10 DIAGNOSIS — Z00.00 WELL ADULT EXAM: ICD-10-CM

## 2025-04-10 DIAGNOSIS — R22.1 NECK MASS: ICD-10-CM

## 2025-04-10 DIAGNOSIS — Z00.00 WELL ADULT EXAM: Primary | ICD-10-CM

## 2025-04-10 LAB
ALBUMIN SERPL-MCNC: 5 G/DL (ref 3.2–4.8)
ALBUMIN/GLOB SERPL: 1.8 {RATIO} (ref 1–2)
ALP LIVER SERPL-CCNC: 63 U/L (ref 37–98)
ALT SERPL-CCNC: 20 U/L (ref 10–49)
ANION GAP SERPL CALC-SCNC: 6 MMOL/L (ref 0–18)
AST SERPL-CCNC: 23 U/L (ref ?–34)
BASOPHILS # BLD AUTO: 0.04 X10(3) UL (ref 0–0.2)
BASOPHILS NFR BLD AUTO: 0.5 %
BILIRUB SERPL-MCNC: 0.7 MG/DL (ref 0.3–1.2)
BUN BLD-MCNC: 14 MG/DL (ref 9–23)
BUN/CREAT SERPL: 17.3 (ref 10–20)
CALCIUM BLD-MCNC: 9.7 MG/DL (ref 8.7–10.4)
CHLORIDE SERPL-SCNC: 105 MMOL/L (ref 98–112)
CHOLEST SERPL-MCNC: 222 MG/DL (ref ?–200)
CO2 SERPL-SCNC: 27 MMOL/L (ref 21–32)
CREAT BLD-MCNC: 0.81 MG/DL (ref 0.55–1.02)
DEPRECATED RDW RBC AUTO: 36.9 FL (ref 35.1–46.3)
EGFRCR SERPLBLD CKD-EPI 2021: 96 ML/MIN/1.73M2 (ref 60–?)
EOSINOPHIL # BLD AUTO: 0.32 X10(3) UL (ref 0–0.7)
EOSINOPHIL NFR BLD AUTO: 3.6 %
ERYTHROCYTE [DISTWIDTH] IN BLOOD BY AUTOMATED COUNT: 12.1 % (ref 11–15)
FASTING PATIENT LIPID ANSWER: NO
FASTING STATUS PATIENT QL REPORTED: NO
GLOBULIN PLAS-MCNC: 2.8 G/DL (ref 2–3.5)
GLUCOSE BLD-MCNC: 81 MG/DL (ref 70–99)
HCT VFR BLD AUTO: 40.3 % (ref 35–48)
HDLC SERPL-MCNC: 55 MG/DL (ref 40–59)
HGB BLD-MCNC: 13 G/DL (ref 12–16)
IMM GRANULOCYTES # BLD AUTO: 0.02 X10(3) UL (ref 0–1)
IMM GRANULOCYTES NFR BLD: 0.2 %
LDLC SERPL CALC-MCNC: 154 MG/DL (ref ?–100)
LYMPHOCYTES # BLD AUTO: 2.18 X10(3) UL (ref 1–4)
LYMPHOCYTES NFR BLD AUTO: 24.8 %
MCH RBC QN AUTO: 27.1 PG (ref 26–34)
MCHC RBC AUTO-ENTMCNC: 32.3 G/DL (ref 31–37)
MCV RBC AUTO: 84 FL (ref 80–100)
MONOCYTES # BLD AUTO: 0.42 X10(3) UL (ref 0.1–1)
MONOCYTES NFR BLD AUTO: 4.8 %
NEUTROPHILS # BLD AUTO: 5.82 X10 (3) UL (ref 1.5–7.7)
NEUTROPHILS # BLD AUTO: 5.82 X10(3) UL (ref 1.5–7.7)
NEUTROPHILS NFR BLD AUTO: 66.1 %
NONHDLC SERPL-MCNC: 167 MG/DL (ref ?–130)
OSMOLALITY SERPL CALC.SUM OF ELEC: 286 MOSM/KG (ref 275–295)
PLATELET # BLD AUTO: 257 10(3)UL (ref 150–450)
POTASSIUM SERPL-SCNC: 4.6 MMOL/L (ref 3.5–5.1)
PROT SERPL-MCNC: 7.8 G/DL (ref 5.7–8.2)
RBC # BLD AUTO: 4.8 X10(6)UL (ref 3.8–5.3)
SODIUM SERPL-SCNC: 138 MMOL/L (ref 136–145)
TRIGL SERPL-MCNC: 76 MG/DL (ref 30–149)
TSI SER-ACNC: 1.21 UIU/ML (ref 0.55–4.78)
VIT D+METAB SERPL-MCNC: 31.6 NG/ML (ref 30–100)
VLDLC SERPL CALC-MCNC: 14 MG/DL (ref 0–30)
WBC # BLD AUTO: 8.8 X10(3) UL (ref 4–11)

## 2025-04-10 PROCEDURE — 80053 COMPREHEN METABOLIC PANEL: CPT

## 2025-04-10 PROCEDURE — 3008F BODY MASS INDEX DOCD: CPT | Performed by: STUDENT IN AN ORGANIZED HEALTH CARE EDUCATION/TRAINING PROGRAM

## 2025-04-10 PROCEDURE — 80061 LIPID PANEL: CPT

## 2025-04-10 PROCEDURE — 84443 ASSAY THYROID STIM HORMONE: CPT

## 2025-04-10 PROCEDURE — 82306 VITAMIN D 25 HYDROXY: CPT

## 2025-04-10 PROCEDURE — 3078F DIAST BP <80 MM HG: CPT | Performed by: STUDENT IN AN ORGANIZED HEALTH CARE EDUCATION/TRAINING PROGRAM

## 2025-04-10 PROCEDURE — 36415 COLL VENOUS BLD VENIPUNCTURE: CPT

## 2025-04-10 PROCEDURE — 85025 COMPLETE CBC W/AUTO DIFF WBC: CPT

## 2025-04-10 PROCEDURE — 83036 HEMOGLOBIN GLYCOSYLATED A1C: CPT

## 2025-04-10 PROCEDURE — 3074F SYST BP LT 130 MM HG: CPT | Performed by: STUDENT IN AN ORGANIZED HEALTH CARE EDUCATION/TRAINING PROGRAM

## 2025-04-10 PROCEDURE — 99395 PREV VISIT EST AGE 18-39: CPT | Performed by: STUDENT IN AN ORGANIZED HEALTH CARE EDUCATION/TRAINING PROGRAM

## 2025-04-10 NOTE — PROGRESS NOTES
"Subjective   Patient ID: Tyler Silva is a 76 y.o. male who presents for Shortness of Breath, Cough, and Foot Swelling (Shortness of breath, cough, swelling in both feet and legs).    Shortness of Breath  Associated symptoms include leg swelling.   Cough  Associated symptoms include shortness of breath.    Not all the time.    Since he was in to see Dr. Nava, has had more swelling in feet.    Some swelling in feet and lower legs  Usually both every day.  Has compression but does not always wear.  Ordered compression with copper.   Got some from [facility] in the past.    Last visit Dr. Nava took him off Lisinopril and changed to Losartan.  (Per chart Losartan with 12.5 of hydrochlorothiazide)  \"Not a lot of change since then\"    Has been on Neurontin once daily (was originally three per day)  Occasionally takes extra strength Tylenol.  Water - 50-60 ounces per day  Coffee 2 per day  Limited salt  Has a recliner and feet are up most of the time.    Also has adjustable bed with more significant elevation at bedtime.    Feet up ~ 60 % of the Swelling in AM is less.  Blood Pressure at home ~ 120/80 or below.    Still doing PT three times per week.  PT checks BP  140/??  A little shortness of breath.  More with activity but also at rest.    Feels a  little wheezy at bedtime.  Sleeping a little more upright.  Has been wheezing about a month.  Has been about the same day time and night time.   No regular visits with Cardiology  3-4 years ago - had a CATH and found a big blood clot in lung   No signs of gout.    Would like a Shingles shot today.   - knows it may not be covered by insurance.      Review of Systems   Respiratory:  Positive for cough and shortness of breath.    Cardiovascular:  Positive for leg swelling.   All other systems reviewed and are negative.        Objective   /82   Pulse 65   Temp 36.5 °C (97.7 °F)   Ht 1.854 m (6' 1\")   Wt 118 kg (260 lb)   SpO2 95%   BMI 34.30 kg/m²     Physical " HPI:    Patient ID: Melissa Schoenheider is a 34 year old female.    HPI  Pt presenting for routine physical exam. Denies any acute issues or recent illnesses. No significant chronic medical problems. Past medical/surgical history, family history, and social history were reviewed.     New bump on Right clavicle 6 months  More knobby that initially  Nontender  No recent illnesses or known trauma  Of note, prior lipoma in Right axillary excised by Derm: keratin filled cyst    Mood stable, denies SH/SI/HI    Review of Systems   A comprehensive 10 point review of systems was completed.  Pertinent positives and negatives noted in the the HPI.     Current Medications[1]  Allergies:Allergies[2]   Vitals:    04/10/25 1512   BP: 112/72   Pulse: 71   Temp: 98 °F (36.7 °C)   SpO2: 99%   Weight: 164 lb (74.4 kg)   Height: 5' 5\" (1.651 m)       Body mass index is 27.29 kg/m².   PHYSICAL EXAM:   Physical Exam  Vitals reviewed.   Constitutional:       General: She is not in acute distress.     Appearance: Normal appearance. She is well-developed.   HENT:      Head: Normocephalic and atraumatic.      Right Ear: Tympanic membrane, ear canal and external ear normal.      Left Ear: Tympanic membrane, ear canal and external ear normal.   Eyes:      Conjunctiva/sclera: Conjunctivae normal.   Neck:      Thyroid: No thyroid mass or thyroid tenderness.        Comments: Palpable abnormality to Right medial clavicle with soft mobile component <5mm  Cardiovascular:      Rate and Rhythm: Normal rate and regular rhythm.      Pulses: Normal pulses.      Heart sounds: Normal heart sounds, S1 normal and S2 normal. No murmur heard.  Pulmonary:      Effort: Pulmonary effort is normal. No respiratory distress.      Breath sounds: Normal breath sounds. No wheezing, rhonchi or rales.   Abdominal:      General: Bowel sounds are normal.      Palpations: Abdomen is soft.      Tenderness: There is no abdominal tenderness. There is no guarding or rebound.    Musculoskeletal:      Cervical back: Normal range of motion and neck supple. No muscular tenderness.      Right lower leg: No edema.      Left lower leg: No edema.   Lymphadenopathy:      Cervical: No cervical adenopathy.   Skin:     General: Skin is warm and dry.      Coloration: Skin is not jaundiced.   Neurological:      General: No focal deficit present.      Mental Status: She is alert and oriented to person, place, and time. Mental status is at baseline.   Psychiatric:         Attention and Perception: Attention normal.         Mood and Affect: Mood normal.         Behavior: Behavior normal. Behavior is cooperative.         Cognition and Memory: Cognition normal.             ASSESSMENT/PLAN:   1. Well adult exam  Discussed preventative screenings  - Pap 1/2024  - will check labs as below  - encouraged to continue diet/exercise for overall wellness  - follow-up with eye doctor annually  - follow-up with dentist every 6 months  - return yearly for physicals  - annual flu shot  - tetanus booster every 10yrs  - TSH W Reflex To Free T4; Future  - Comp Metabolic Panel (14); Future  - Hemoglobin A1C; Future  - Lipid Panel; Future  - Vitamin D; Future  - CBC With Differential With Platelet; Future    2. Neck mass  Discussed DDx including LAD, lipoma/cyst, etc  Will check imaging for further evaluation  - CT SOFT TISSUE OF NECK(CONTRAST ONLY) (CPT=70491); Future    Pt verbalized understanding and agrees with plan.    Orders Placed This Encounter   Procedures    TSH W Reflex To Free T4    Comp Metabolic Panel (14)    Hemoglobin A1C    Lipid Panel    Vitamin D    CBC With Differential With Platelet       Meds This Visit:  Requested Prescriptions      No prescriptions requested or ordered in this encounter       Imaging & Referrals:  CT SOFT TISSUE OF NECK(CONTRAST ONLY) (CPT=70491)         ID#[id number]         [1]   Current Outpatient Medications   Medication Sig Dispense Refill    methylPREDNISolone 4 MG Oral Tablet  Exam  Vitals reviewed.   Constitutional:       Appearance: Normal appearance. He is obese.   HENT:      Head: Normocephalic and atraumatic.   Cardiovascular:      Rate and Rhythm: Normal rate and regular rhythm.      Pulses: Normal pulses.   Musculoskeletal:      Cervical back: Normal range of motion and neck supple.      Right lower le+ Edema present.      Left lower le+ Edema present.   Neurological:      Mental Status: He is alert.     Pt is here with wife today.        Assessment/Plan     Edema/HTN/SOB  Daily weights and BP checks  Change Amlodipine to 1/2 of 5 mg tablet daily  Change to increased hydrochlorothiazide + 12.5  (25 total)  Labs today  Follow up in ~1 week via [facility]  Shingrix #1 today        Therapy Pack Take 1 tablet (4 mg total) by mouth As Directed. (Patient not taking: Reported on 4/10/2025)     [2]   Allergies  Allergen Reactions    Seasonal UNKNOWN

## 2025-04-11 LAB
EST. AVERAGE GLUCOSE BLD GHB EST-MCNC: 100 MG/DL (ref 68–126)
HBA1C MFR BLD: 5.1 % (ref ?–5.7)

## 2025-04-15 ENCOUNTER — MED REC SCAN ONLY (OUTPATIENT)
Dept: FAMILY MEDICINE CLINIC | Facility: CLINIC | Age: 35
End: 2025-04-15

## 2025-04-16 ENCOUNTER — PATIENT MESSAGE (OUTPATIENT)
Dept: FAMILY MEDICINE CLINIC | Facility: CLINIC | Age: 35
End: 2025-04-16

## 2025-04-16 DIAGNOSIS — R22.1 NECK MASS: Primary | ICD-10-CM

## 2025-04-17 NOTE — TELEPHONE ENCOUNTER
I left detailed message on verbal release verified and identified voicemail # [phone] making aware once she has contact information for external diagnostic imaging facility she may contact us with the facility's contact information and fax # so we can send order to them; also making aware of [employer] message being sent

## 2025-04-21 NOTE — ADDENDUM NOTE
Addended by: VICTORINO GOODMAN on: 4/21/2025 09:37 AM     Modules accepted: Orders     (M6) obeys commands

## 2025-04-29 ENCOUNTER — PATIENT MESSAGE (OUTPATIENT)
Dept: PODIATRY CLINIC | Facility: CLINIC | Age: 35
End: 2025-04-29

## 2025-04-29 DIAGNOSIS — L60.0 INGROWN TOENAIL: Primary | ICD-10-CM

## 2025-04-30 NOTE — TELEPHONE ENCOUNTER
Please see picture. Patient has appointment on 5/5 - did you want to send an antibiotic or should she watch and wait (advised to start soaks)

## 2025-05-01 ENCOUNTER — OFFICE VISIT (OUTPATIENT)
Dept: OTOLARYNGOLOGY | Facility: CLINIC | Age: 35
End: 2025-05-01
Payer: COMMERCIAL

## 2025-05-01 VITALS — BODY MASS INDEX: 27.32 KG/M2 | WEIGHT: 164 LBS | HEIGHT: 65 IN

## 2025-05-01 DIAGNOSIS — R22.1 NECK MASS: Primary | ICD-10-CM

## 2025-05-01 PROCEDURE — 99203 OFFICE O/P NEW LOW 30 MIN: CPT | Performed by: OTOLARYNGOLOGY

## 2025-05-01 PROCEDURE — 3008F BODY MASS INDEX DOCD: CPT | Performed by: OTOLARYNGOLOGY

## 2025-05-03 NOTE — PROGRESS NOTES
Melissa Schoenheider is a 34 year old female.    Chief Complaint   Patient presents with    New Patient    Mass     Patient here for neck mass consultation. Pt had CT soft tissue of neck scan at [facility] on 04/18/2025.       HISTORY OF PRESENT ILLNESS  She presents today with a history of finding of a structure overlying her clavicle on the right.  Went to her primary care physician with CT scan performed back in mid April demonstrating a 0.8 cm ovoid structure within the pectoralis musculature adjacent to the clavicle.  Appears to be solid cystic in nature.  Images are not available for my viewing.  I did review the report with the patient and her .  She denies any associated signs or symptoms specifically denying any pain or discomfort.  Sent by Dr. Walter for my opinion regarding her findings.      Social Hx on file[1]    Family History[2]    Past Medical History[3]    Past Surgical History[4]      REVIEW OF SYSTEMS    System Neg/Pos Details   Constitutional Negative Fatigue, fever and weight loss.   ENMT Negative Drooling.   Eyes Negative Blurred vision and vision changes.   Respiratory Negative Dyspnea and wheezing.   Cardio Negative Chest pain, irregular heartbeat/palpitations and syncope.   GI Negative Abdominal pain and diarrhea.   Endocrine Negative Cold intolerance and heat intolerance.   Neuro Negative Tremors.   Psych Negative Anxiety and depression.   Integumentary Negative Frequent skin infections, pigment change and rash.   Hema/Lymph Negative Easy bleeding and easy bruising.           PHYSICAL EXAM    Ht 5' 5\" (1.651 m)   Wt 164 lb (74.4 kg)   LMP 03/21/2025 (Exact Date)   BMI 27.29 kg/m²        Constitutional Normal Overall appearance - Normal.   Psychiatric Normal Orientation - Oriented to time, place, person & situation. Appropriate mood and affect.   Neck Exam Normal Inspection - Normal. Palpation - Normal. Parotid gland - Normal. Thyroid gland - Normal.  Palpable 1 cm  structure overlying the clavicle at the midpoint of the clavicle on the right.  Nontender to palpation.  Seems to be slightly mobile   Eyes Normal Conjunctiva - Right: Normal, Left: Normal. Pupil - Right: Normal, Left: Normal. Fundus - Right: Normal, Left: Normal.   Neurological Normal Memory - Normal. Cranial nerves - Cranial nerves II through XII grossly intact.   Head/Face Normal Facial features - Normal. Eyebrows - Normal. Skull - Normal.        Nasopharynx Normal External nose - Normal. Lips/teeth/gums - Normal. Tonsils - Normal. Oropharynx - Normal.   Ears Normal Inspection - Right: Normal, Left: Normal. Canal - Right: Normal, Left: Normal. TM - Right: Normal, Left: Normal.   Skin Normal Inspection - Normal.        Lymph Detail Normal Submental. Submandibular. Anterior cervical. Posterior cervical. Supraclavicular.        Nose/Mouth/Throat Normal External nose - Normal. Lips/teeth/gums - Normal. Tonsils - Normal. Oropharynx - Normal.   Nose/Mouth/Throat Normal Nares - Right: Normal Left: Normal. Septum -Normal  Turbinates - Right: Normal, Left: Normal.     Medications - Current[5]  ASSESSMENT AND PLAN    1. Neck mass  Appears to be a benign mass overlying her clavicle on the right.  CT scan results were reviewed.  CT scan images are not available to me at this time.  I did recommend either excision or watchful waiting.  I do suspect this to be benign therefore at this time she wishes to hold off on any surgical intervention therefore I did recommend that she return to see me in 3 months for reevaluation to see if this structure has increased in size.  In addition we did discuss repeating a CT scan in about 6 months to see if there is any interval growth of this lesion.  She agrees with this plan.        This note was prepared using [employer] voice recognition dictation software. As a result errors may occur. When identified these errors have been corrected. While every attempt is made to correct errors  during dictation discrepancies may still exist    Black Allen MD    5/3/2025    8:51 AM         [1]   Social History  Socioeconomic History    Marital status: Single   Tobacco Use    Smoking status: Never     Passive exposure: Never    Smokeless tobacco: Never   Vaping Use    Vaping status: Never Used   Substance and Sexual Activity    Alcohol use: Yes     Comment: socially    Drug use: Never    Sexual activity: Yes     Birth control/protection: I.U.D.   [2]   Family History  Problem Relation Age of Onset    Hypertension Mother 38    Other (congestive heart failure) Maternal Grandmother    [3] History reviewed. No pertinent past medical history.  [4] History reviewed. No pertinent surgical history.  [5]   Current Outpatient Medications:     amoxicillin clavulanate 875-125 MG Oral Tab, Take 1 tablet by mouth 2 (two) times daily., Disp: 30 tablet, Rfl: 0    methylPREDNISolone 4 MG Oral Tablet Therapy Pack, Take 1 tablet (4 mg total) by mouth As Directed. (Patient not taking: Reported on 4/10/2025), Disp: , Rfl:

## 2025-05-05 ENCOUNTER — OFFICE VISIT (OUTPATIENT)
Dept: PODIATRY CLINIC | Facility: CLINIC | Age: 35
End: 2025-05-05
Payer: COMMERCIAL

## 2025-05-05 DIAGNOSIS — L60.0 INGROWN TOENAIL: Primary | ICD-10-CM

## 2025-05-05 PROCEDURE — 99214 OFFICE O/P EST MOD 30 MIN: CPT | Performed by: STUDENT IN AN ORGANIZED HEALTH CARE EDUCATION/TRAINING PROGRAM

## 2025-05-05 NOTE — PROGRESS NOTES
[facility]  Progress Note      Melissa Schoenheider is a 34 year old female.   Chief Complaint   Patient presents with    Ingrown Toenail     L hallux - medial nail border- onset- noticed redness and pain a wk ago- denies injury- rates pain 2-3/10 only when touched- still taking Rx antibiotic             HPI:       Patient is a pleasant 34-year-old female presents to clinic today for evaluation of a left medial hallux ingrown nail border.  Patient is currently taking oral meloxicam.  Admits to redness and pain approximately a week ago.  Denies any injuries or trauma.  Patient was last evaluated in our office in 2024 where she had a slant back of that same nail site.  Allergies: Seasonal    Current Medications[1]   Past Medical History[2]   Past Surgical History[3]   Family History[4]   Social Hx on file[5]        REVIEW OF SYSTEMS:     Denies nause, fever, chills  No calf pain  Denies chest pain or SOB      EXAM:   [facility] 03/21/2025 (Exact Date)   GENERAL: well developed, well nourished, in no apparent distress  EXTREMITIES:   1. Integument: Normal skin temperature and turgor.  Related left medial hallux nail border with no purulent drainage  2. Vascular: Dorsalis pedis two out of four bilateral and posterior tibial pulses two out of   four bilateral, capillary refill normal.   3. Musculoskeletal: All muscle groups are graded 5 out of 5 in the foot and ankle.  Tenderness palpation to left hallux   4. Neurological: Normal sharp dull sensation; reflexes normal.             ASSESSMENT AND PLAN:   Diagnoses and all orders for this visit:    Ingrown toenail        Plan:       Patient seen and examined and findings discussed with patient.  Discussed etiology of condition along with treatment options.  Discussed a partial left hallux nail avulsion with chemical matricectomy.  We also discussed possibility of slant back.  Patient wishes to proceed with a matricectomy procedure.  Advised patient to return to  clinic on Friday for a left partial medial hallux nail avulsion with chemical matricectomy.  Continue taking oral antibiotic as instructed.  Soak feet in warm water and Epsom salt.  Clinic on Friday for procedure    The patient indicates understanding of these issues and agrees to the plan.        Colleen Raymond DPM        [1]   Current Outpatient Medications   Medication Sig Dispense Refill    amoxicillin clavulanate 875-125 MG Oral Tab Take 1 tablet by mouth 2 (two) times daily. 30 tablet 0    methylPREDNISolone 4 MG Oral Tablet Therapy Pack Take 1 tablet (4 mg total) by mouth As Directed. (Patient not taking: Reported on 4/10/2025)     [2] History reviewed. No pertinent past medical history.  [3] History reviewed. No pertinent surgical history.  [4]   Family History  Problem Relation Age of Onset    Hypertension Mother 38    Other (congestive heart failure) Maternal Grandmother    [5]   Social History  Socioeconomic History    Marital status: Single   Tobacco Use    Smoking status: Never     Passive exposure: Never    Smokeless tobacco: Never   Vaping Use    Vaping status: Never Used   Substance and Sexual Activity    Alcohol use: Yes     Comment: socially    Drug use: Never    Sexual activity: Yes     Birth control/protection: I.U.D.

## 2025-07-01 ENCOUNTER — OFFICE VISIT (OUTPATIENT)
Dept: OTOLARYNGOLOGY | Facility: CLINIC | Age: 35
End: 2025-07-01
Payer: COMMERCIAL

## 2025-07-01 DIAGNOSIS — R22.1 NECK MASS: Primary | ICD-10-CM

## 2025-07-01 PROCEDURE — 99213 OFFICE O/P EST LOW 20 MIN: CPT | Performed by: OTOLARYNGOLOGY

## 2025-07-01 NOTE — PROGRESS NOTES
Melissa Schoenheider is a 34 year old female.    Chief Complaint   Patient presents with    Lump     Patient has a lump on the right side clavicle.       HISTORY OF PRESENT ILLNESS    She presents today with a history of finding of a structure overlying her clavicle on the right.  Went to her primary care physician with CT scan performed back in mid April demonstrating a 0.8 cm ovoid structure within the pectoralis musculature adjacent to the clavicle.  Appears to be solid cystic in nature.  Images are not available for my viewing.  I did review the report with the patient and her .  She denies any associated signs or symptoms specifically denying any pain or discomfort.  Sent by Dr. Walter for my opinion regarding her findings.     7/1/25 she presents today with her CT scan results.  Previous scan had demonstrated 0.8 cm ovoid structure in the pectoralis musculature adjacent to the clavicle.  She feels that the patient has not really changed in size at this time.  I last saw her 2 months ago.  Here for further evaluation and management.    Social Hx on file[1]    Family History[2]    Past Medical History[3]    Past Surgical History[4]      REVIEW OF SYSTEMS    System Neg/Pos Details   Constitutional Negative Fatigue, fever and weight loss.   ENMT Negative Drooling.   Eyes Negative Blurred vision and vision changes.   Respiratory Negative Dyspnea and wheezing.   Cardio Negative Chest pain, irregular heartbeat/palpitations and syncope.   GI Negative Abdominal pain and diarrhea.   Endocrine Negative Cold intolerance and heat intolerance.   Neuro Negative Tremors.   Psych Negative Anxiety and depression.   Integumentary Negative Frequent skin infections, pigment change and rash.   Hema/Lymph Negative Easy bleeding and easy bruising.           PHYSICAL EXAM    LMP 03/21/2025 (Exact Date)        Constitutional Normal Overall appearance - Normal.   Psychiatric Normal Orientation - Oriented to time, place, person &  situation. Appropriate mood and affect.   Neck Exam Normal Inspection - Normal. Palpation -subcentimeter structure right clavicle parotid gland - Normal. Thyroid gland - Normal.   Eyes Normal Conjunctiva - Right: Normal, Left: Normal. Pupil - Right: Normal, Left: Normal. Fundus - Right: Normal, Left: Normal.   Neurological Normal Memory - Normal. Cranial nerves - Cranial nerves II through XII grossly intact.   Head/Face Normal Facial features - Normal. Eyebrows - Normal. Skull - Normal.        Nasopharynx Normal External nose - Normal. Lips/teeth/gums - Normal. Tonsils - Normal. Oropharynx - Normal.   Ears Normal Inspection - Right: Normal, Left: Normal. Canal - Right: Normal, Left: Normal. TM - Right: Normal, Left: Normal.   Skin Normal Inspection - Normal.        Lymph Detail Normal Submental. Submandibular. Anterior cervical. Posterior cervical. Supraclavicular.        Nose/Mouth/Throat Normal External nose - Normal. Lips/teeth/gums - Normal. Tonsils - Normal. Oropharynx - Normal.   Nose/Mouth/Throat Normal Nares - Right: Normal Left: Normal. Septum -Normal  Turbinates - Right: Normal, Left: Normal.     Medications - Current[5]  ASSESSMENT AND PLAN    1. Neck mass  Unchanged lesion near the clavicle which appears to be within the pectoralis musculature on the right.  Stable for the last 2 months.  We did discuss management options including exploration and surgical excision versus watchful waiting.  At this time she is leaning towards being more conservative with management of the structure therefore I did recommend that she repeat a CT scan at 6 months from the previous study therefore she will return to see me in October after her CT scan to discuss future management.  Should she note any sudden change in the appearance or size of structure or develop any symptoms she will return to see me sooner.  - CT SOFT TISSUE OF NECK(CONTRAST ONLY) (CPT=70491); Future        This note was prepared using [employer]  voice recognition dictation software. As a result errors may occur. When identified these errors have been corrected. While every attempt is made to correct errors during dictation discrepancies may still exist    Black Allen MD    7/1/2025    4:27 PM         [1]   Social History  Socioeconomic History    Marital status: Single   Tobacco Use    Smoking status: Never     Passive exposure: Never    Smokeless tobacco: Never   Vaping Use    Vaping status: Never Used   Substance and Sexual Activity    Alcohol use: Yes     Comment: socially    Drug use: Never    Sexual activity: Yes     Birth control/protection: I.U.D.   [2]   Family History  Problem Relation Age of Onset    Hypertension Mother 38    Other (congestive heart failure) Maternal Grandmother    [3] History reviewed. No pertinent past medical history.  [4] History reviewed. No pertinent surgical history.  [5]   Current Outpatient Medications:     amoxicillin clavulanate 875-125 MG Oral Tab, Take 1 tablet by mouth 2 (two) times daily. (Patient not taking: Reported on 7/1/2025), Disp: 30 tablet, Rfl: 0    methylPREDNISolone 4 MG Oral Tablet Therapy Pack, Take 1 tablet (4 mg total) by mouth As Directed. (Patient not taking: Reported on 4/10/2025), Disp: , Rfl:

## (undated) NOTE — MR AVS SNAPSHOT
After Visit Summary   1/10/2024    Melissa Schoenheider   MRN: [id number]           Visit Information     Date & Time  1/10/2024  1:20 PM Provider  Precious Bhardwaj MD Department  [facility] - OB/GYN Dept. Phone  [phone]      Your Vitals Were  Most recent update: 1/10/2024  1:22 PM    BP   129/86    Pulse   59    Wt   165 lb 12.8 oz    BMI   27.17 kg/m²          Allergies as of 1/10/2024  Review status set to Review Complete on 1/10/2024       Noted Reaction Type Reactions    Seasonal 11/21/2022    UNKNOWN      Your Current Medications        Dosage    IUD'S IU     methylPREDNISolone 4 MG Oral Tablet Therapy Pack Take 1 tablet (4 mg total) by mouth As Directed.      Diagnoses for This Visit    Encounter for gynecological examination without abnormal finding   [356728]  -  Primary  IUD (intrauterine device) in place   [566584]    History of dysmenorrhea   [118165]    Screening for cervical cancer   [384704]             We Ordered the Following     Normal Orders This Visit    Hpv Dna  High Risk , Thin Prep Collect [QVT7025 CUSTOM]     THINPREP PAP SMEAR ONLY [[id number] CUSTOM]     ThinPrep PAP Smear [[id number] CUSTOM]     Future Labs/Procedures Expected by Expires    Hpv Dna  High Risk , Thin Prep Collect [[id number] CUSTOM]  1/10/2024 1/10/2025    ThinPrep PAP Smear [[id number] CUSTOM]  1/10/2024 1/10/2025      Future Appointments        Provider Department    2/28/2024 3:10 PM Precious Bhardwaj [facility] - OB/GYN                Did you know that [facility] primary care physicians now offer Video Visits through [employer] for adult patients for a variety of conditions such as allergies, back pain and cold symptoms? Skip the drive and waiting room and online chat with a doctor face-to-face using your web-cam enabled computer or mobile device wherever you are. Video Visits cost $50 and can be paid hassle-free using a credit, debit, or health  savings card.  Not active on [employer]? Ask us how to get signed up today!          If you receive a survey from [employer], please take a few minutes to complete it and provide feedback. We strive to deliver the best patient experience and are looking for ways to make improvements. Your feedback will help us do so. For more information on [facility], please visit [email or web address]           No text in SmartText           No text in SmartText

## (undated) NOTE — LETTER
AUTHORIZATION FOR SURGICAL OPERATION OR OTHER PROCEDURE    1. I hereby authorize Dr. CATALAN, and [facility] staff assigned to my case to perform the following operation and/or procedure at the [facility]:    IUD REMOVAL    2.  My physician has explained the nature and purpose of the operation or other procedure, possible alternative methods of treatment, the risks involved, and the possibility of complication to me.  I acknowledge that no guarantee has been made as to the result that may be obtained.  3.  I recognize that, during the course of this operation, or other procedure, unforseen conditions may necessitate additional or different procedure than those listed above.  I, therefore, further authorize and request that the above named physician, his/her physician assistants or designees perform such procedures as are, in his/her professional opinion, necessary and desirable.  4.  Any tissue or organs removed in the operation or other procedure may be disposed of by and at the discretion of the [facility] and [facility].  5.  I understand that in the event of a medical emergency, I will be transported by local paramedics to [facility] or other hospital emergency department.  6.  I certify that I have read and fully understand the above consent to operation and/or other procedure.    7.  I acknowledge that my physician has explained sedation/analgesia administration to me including the risks and benefits.  I consent to the administration of sedation/analgesia as may be necessary or desirable in the judgement of my physician.    Witness signature: ___________________________________________________ Date:  ______/______/_____                    Time:  ________ A.M.  P.M.       Patient Name:  ______________________________________________________  (please print)      Patient signature:  ___________________________________________________             Relationship to  Patient:           []  Parent    Responsible person                          []  Spouse  In case of minor or                    [] Other  _____________   Incompetent name:  __________________________________________________                               (please print)      _____________      Responsible person  In case of minor or  Incompetent signature:  _______________________________________________    Statement of Physician  My signature below affirms that prior to the time of the procedure, I have explained to the patient and/or his/her guardian, the risks and benefits involved in the proposed treatment and any reasonable alternative to the proposed treatment.  I have also explained the risks and benefits involved in the refusal of the proposed treatment and have answered the patient's questions.                        Date:  ______/______/_______  Provider                      Signature:  __________________________________________________________       Time:  ___________ A.M    P.M.

## (undated) NOTE — LETTER
Chrissy Walter Md  [address] [place] [id number]       05/03/25        Patient: Melissa Schoenheider   YOB: 1990   Date of Visit: 5/1/2025       Dear  Dr. Jose Angel MD,      Thank you for referring Melissa Schoenheider to my practice.  Please find my assessment and plan below.    ASSESSMENT AND PLAN    1. Neck mass  Appears to be a benign mass overlying her clavicle on the right.  CT scan results were reviewed.  CT scan images are not available to me at this time.  I did recommend either excision or watchful waiting.  I do suspect this to be benign therefore at this time she wishes to hold off on any surgical intervention therefore I did recommend that she return to see me in 3 months for reevaluation to see if this structure has increased in size.  In addition we did discuss repeating a CT scan in about 6 months to see if there is any interval growth of this lesion.  She agrees with this plan.               Sincerely,   Black Allen MD   [facility] MEDICAL [facility], [facility]  [address] [id number]    Document electronically generated by:  Black Allen MD